# Patient Record
(demographics unavailable — no encounter records)

---

## 2025-07-17 NOTE — ASSESSMENT
[FreeTextEntry1] : Impression: Osteoarthritis left ankle. Wart left foot. Pain foot, bilateral.  Treatment: I attempted full thickness debridement of the wart after prepping with alcohol and opening a debridement tray. I touched it with TCA/chemocautery. He is going to use duct tape on the area for the next month.  Regarding the left ankle, he wants to try to avoid any type of surgical intervention which I agree with.  After obtaining verbal consent, a time out was performed to the identified area of maximal tenderness over the joint site. I prepped the area with alcohol and injected the ankle under strict sterile technique with 2 1/2cc's of a combination of Xylocaine and Kenalog-40. I gave him a series of strengthening, balance and stretching exercises to work on. I recommended an anklet. Shoes are orthopedic and adequate. I will see him back in the office for evaluation as needed.

## 2025-07-17 NOTE — HISTORY OF PRESENT ILLNESS
[FreeTextEntry1] : Patient presents today for evaluation.  He has a history of diabetes, high blood pressure, enlarged prostate, nocturia, hypothyroidism, lumbar spondylosis, facet arthropathy, and spinal stenosis. He complains of pain at the left ankle that occasionally locks. It is sensitive, about 5/10. He has pain at the right foot over the sinus tarsi. He has a left side wart that has been treated in the past in the sub 1 area.

## 2025-07-17 NOTE — PHYSICAL EXAM
[2+] : left foot dorsalis pedis 2+ [Vibration Dec.] : diminished vibratory sensation at the level of the toes [Position Sense Dec.] : diminished position sense at the level of the toes [Diminished Throughout Right Foot] : diminished sensation with monofilament testing throughout right foot [Diminished Throughout Left Foot] : diminished sensation with monofilament testing throughout left foot [Ankle Swelling (On Exam)] : not present [Varicose Veins Of Lower Extremities] : not present [] : not present [Delayed in the Right Toes] : capillary refills normal in right toes [Delayed in the Left Toes] : capillary refills normal in the left toes [FreeTextEntry3] : Hair growth noted on digits. Proximal to distal cooling is within normal limits.  [de-identified] : Patient has equinus. He has a tight posterior muscle group. He has some stiffness and limited motion at the left ankle. All tendons are noted to be intact without dislocation. He has good stability. He has some pain with extreme ROM at the left ankle joint. On the right side he has pain over the sinus tarsi with good ROM and he has hallux limitus. [FreeTextEntry1] : Left sub 1 wart and sub 2 that is smaller. The sub 1 area is 6m.  There is cauliflowered appearance and loss of skin lines. Benign, obvious wart.

## 2025-07-17 NOTE — PROCEDURE
[FreeTextEntry1] : X-ray Report: (Right foot - 3 views) X-rays demonstrate bunion, tailor's bunion, hallux limitus of the 1st MPJ but no real arthritis at the subtalar joint. There is arthritis at the ankle. (Left ankle - 3 views) On the left side the patient has an arthritic left ankle. 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 There is some spurring appreciated as well.

## 2025-07-17 NOTE — PHYSICAL EXAM
[2+] : left foot dorsalis pedis 2+ [Vibration Dec.] : diminished vibratory sensation at the level of the toes [Position Sense Dec.] : diminished position sense at the level of the toes [Diminished Throughout Right Foot] : diminished sensation with monofilament testing throughout right foot [Diminished Throughout Left Foot] : diminished sensation with monofilament testing throughout left foot [Ankle Swelling (On Exam)] : not present [Varicose Veins Of Lower Extremities] : not present [] : not present [Delayed in the Right Toes] : capillary refills normal in right toes [Delayed in the Left Toes] : capillary refills normal in the left toes [FreeTextEntry3] : Hair growth noted on digits. Proximal to distal cooling is within normal limits.  [de-identified] : Patient has equinus. He has a tight posterior muscle group. He has some stiffness and limited motion at the left ankle. All tendons are noted to be intact without dislocation. He has good stability. He has some pain with extreme ROM at the left ankle joint. On the right side he has pain over the sinus tarsi with good ROM and he has hallux limitus. [FreeTextEntry1] : Left sub 1 wart and sub 2 that is smaller. The sub 1 area is 6m.  There is cauliflowered appearance and loss of skin lines. Benign, obvious wart.

## 2025-07-17 NOTE — PHYSICAL EXAM
[2+] : left foot dorsalis pedis 2+ [Vibration Dec.] : diminished vibratory sensation at the level of the toes [Position Sense Dec.] : diminished position sense at the level of the toes [Diminished Throughout Right Foot] : diminished sensation with monofilament testing throughout right foot [Diminished Throughout Left Foot] : diminished sensation with monofilament testing throughout left foot [Ankle Swelling (On Exam)] : not present [Varicose Veins Of Lower Extremities] : not present [] : not present [Delayed in the Right Toes] : capillary refills normal in right toes [Delayed in the Left Toes] : capillary refills normal in the left toes [FreeTextEntry3] : Hair growth noted on digits. Proximal to distal cooling is within normal limits.  [de-identified] : Patient has equinus. He has a tight posterior muscle group. He has some stiffness and limited motion at the left ankle. All tendons are noted to be intact without dislocation. He has good stability. He has some pain with extreme ROM at the left ankle joint. On the right side he has pain over the sinus tarsi with good ROM and he has hallux limitus. [FreeTextEntry1] : Left sub 1 wart and sub 2 that is smaller. The sub 1 area is 6m.  There is cauliflowered appearance and loss of skin lines. Benign, obvious wart.